# Patient Record
Sex: FEMALE | Race: BLACK OR AFRICAN AMERICAN | NOT HISPANIC OR LATINO | URBAN - METROPOLITAN AREA
[De-identification: names, ages, dates, MRNs, and addresses within clinical notes are randomized per-mention and may not be internally consistent; named-entity substitution may affect disease eponyms.]

---

## 2024-02-20 DIAGNOSIS — O10.919 PREEXISTING HYPERTENSION COMPLICATING PREGNANCY, ANTEPARTUM: ICD-10-CM

## 2024-02-20 DIAGNOSIS — O09.522 MULTIGRAVIDA OF ADVANCED MATERNAL AGE IN SECOND TRIMESTER: Primary | ICD-10-CM

## 2024-02-20 DIAGNOSIS — O09.899 HISTORY OF PRETERM DELIVERY, CURRENTLY PREGNANT: ICD-10-CM

## 2024-02-26 ENCOUNTER — OFFICE VISIT (OUTPATIENT)
Dept: MATERNAL FETAL MEDICINE | Facility: CLINIC | Age: 39
End: 2024-02-26
Payer: OTHER GOVERNMENT

## 2024-02-26 VITALS
RESPIRATION RATE: 20 BRPM | DIASTOLIC BLOOD PRESSURE: 74 MMHG | BODY MASS INDEX: 28.17 KG/M2 | HEIGHT: 63 IN | HEART RATE: 89 BPM | SYSTOLIC BLOOD PRESSURE: 132 MMHG | WEIGHT: 159 LBS | OXYGEN SATURATION: 99 %

## 2024-02-26 DIAGNOSIS — O09.899 HISTORY OF PRETERM DELIVERY, CURRENTLY PREGNANT: ICD-10-CM

## 2024-02-26 DIAGNOSIS — O10.919 PREEXISTING HYPERTENSION COMPLICATING PREGNANCY, ANTEPARTUM: ICD-10-CM

## 2024-02-26 DIAGNOSIS — O09.522 MULTIGRAVIDA OF ADVANCED MATERNAL AGE IN SECOND TRIMESTER: ICD-10-CM

## 2024-02-26 PROCEDURE — 76811 OB US DETAILED SNGL FETUS: CPT | Mod: S$GLB,,, | Performed by: OBSTETRICS & GYNECOLOGY

## 2024-02-26 PROCEDURE — 99203 OFFICE O/P NEW LOW 30 MIN: CPT | Mod: S$GLB,,, | Performed by: OBSTETRICS & GYNECOLOGY

## 2024-02-26 RX ORDER — ASPIRIN 81 MG/1
81 TABLET ORAL DAILY
COMMUNITY
Start: 2023-12-18 | End: 2024-12-06

## 2024-02-26 NOTE — PROGRESS NOTES
"Elina is here for new [facility] consultation for hypertension in pregnancy, preEclampsia with last baby, AMA with negative NIPT and negative MsAFP, and history of late  delivery x 3 referred by Dr. Flores.    The patient is currently taking nothing for blood pressure control.  She is taking a daily low dose aspirin currently.    She is feeling fetal movement.    Elina denies vaginal bleeding, loss of fluid, recurrent contractions.    The patient visual changes or right upper quadrant pain, but does have occasional headaches relieved with Tylenol.    Vitals:    24 1228   BP: 132/74   Pulse: 89   Resp: 20   SpO2: 99%   Weight: 72.1 kg (159 lb)   Height: 5' 3" (1.6 m)     BMI:                    28.17 kg/m^2               "

## 2024-02-26 NOTE — PROGRESS NOTES
"Initial MFM Consultation  Consulting provider: Dr. Anitra Busby  Referring provider: Dr. Flores    Indications for referral:  1) Pregnancy at 23w3d  2) Chronic hypertension on no medications  3) Advanced maternal age with low risk NIPT  4) History of indicated  delivery x3, and no spontaneous  labor.    Dear Dr. Flores,  Thank you for your kind request for consultation and imaging of your patient at the [facility].  She presents due to the above listed indications.  As you know she is a 39 y.o.  with an Estimated Date of Delivery: 24.     PMH: HTN since last delivery.  Sickle cell trait.  PSH:CD x2. Umbilical hernia repair.  POB: G1-  at 35 weeks after induction for reason patient doesn't remember. G2- CD at 36 weeks due to nuchal cord. G3- CD at 35 weeks due to preeclampsia. HTN persisted for 6 months after delivery requiring Nifedipine 60mg daily.  She has not needed antihypertensive medication since .  FH: cHTN.  2 children with sickle cell trait.  Social: Denies tobacco, alcohol, and illicit substance use.  ALL: NKDA  MEDS: ASA, PNV  ROS:No complaints    Physical Exam  /74   Pulse 89   Resp 20   Ht 5' 3" (1.6 m)   Wt 72.1 kg (159 lb)   SpO2 99%   BMI 28.17 kg/m²   General: Age appearing female in no apparent distress.  HEENT:  Normal external facial features. No scleral icterus.  CHEST:  Normal respiratory effort and able to speak in sentences without difficulty.  ABDOMEN:  Gravid, soft, nontender  EXTREMITIES: Without clubbing, cyanosis, edema  NEURO: No focal deficits.  MENTAL STATUS: No focal deficits.    ULTRASOUND FINDINGS:  A detailed fetal anatomic survey was performed. The fetus is cephalic/breech. EFW of X is at the X percentile.  The placenta is post/anterior.  Amniotic fluid is normal. There is a single umbilical artery. There are no fetal structural malformations to extent of view.  Please see accompanying " detailed Viewpoint report.    IMPRESSION:     1) 23 week gestation  2) AMA with low risk NIPT  3) Chronic hypertension not requiring medical management  4) Fetal single umbilical artery    RECOMMENDATIONS/DISCUSSION:  1)  If the patient ever has persistent blood pressures >140/90 then antihypertensive medications should be initiated.  She should have serial ultrasounds for growth.  The next should be performed in your office in 4 weeks.  We will perform one in our office in 8 weeks.  If she does not develop preeclampsia and does not require medical management, delivery is recommended at 38-39 weeks.    2) We discussed her low risk for aneuploidy now that has had a low risk NIPT.  She was made aware of the difference between screening and diagnostic testing for the most common aneuploidies.  She is satisfied with her current risk assessment and does not desire additional testing.    3) An isolated single umbilical artery is of little consequence to the pregnancy.  It can be associated with fetal growth restriction, which we will be screening for throughout this pregnancy anyway.  No modifications to care are recommended because of this.    Thank you for allowing us to participate in her care.  Please do not hesitate to call with questions.  For any questions feel free to call our thomas MURPHY 24/7 at [phone].  -Anitra Busby MD

## 2024-02-26 NOTE — LETTER
February 26, 2024        Lorrie Flores MD  [address]  [facility] [address]             [place] [facility]  [address]  [place] [address]  Phone: [phone]  Fax: [phone]   Patient: Elina Ruiz   MR Number: [id number]   YOB: 1985   Date of Visit: 2/26/2024       Dear Dr. Flores:    Thank you for referring Elina Ruiz to me for evaluation. Attached you will find relevant portions of my assessment and plan of care.    If you have questions, please do not hesitate to call me. I look forward to following Elina Ruiz along with you.    Sincerely,      Anitra Busby MD            [facility]

## 2024-04-11 DIAGNOSIS — O09.899 HISTORY OF PRETERM DELIVERY, CURRENTLY PREGNANT: ICD-10-CM

## 2024-04-11 DIAGNOSIS — O09.523 MULTIGRAVIDA OF ADVANCED MATERNAL AGE IN THIRD TRIMESTER: ICD-10-CM

## 2024-04-11 DIAGNOSIS — O10.919 PREEXISTING HYPERTENSION COMPLICATING PREGNANCY, ANTEPARTUM: Primary | ICD-10-CM

## 2024-04-20 NOTE — PROGRESS NOTES
Indication for follow up consultation:  Intrauterine pregnancy at 31w3d  Chronic hypertension on no medications  Advanced maternal age with low risk NIPT  History of indicated  delivery x3, and no spontaneous  labor.  Single umbilical artery    Provider requesting consultation: Lorrie Flores MD    Dear Lorrie,    I had the pleasure of seeing Elina Ruiz for follow up consultation and sonography today.  Thank you again for allowing us to assist in her care.  As you recall she is a 39 y.o.  at 31w3d with an Estimated Date of Delivery: 24. Today is a followup visit.  She last saw my colleague Dr. Busby in February.  The patient reports that she has been feeling well and has had no complications since we last saw her.  Fetal movement is normal.    Review of systems: The patient denies any vaginal bleeding, loss of fluid or contraction pain today.  Vitals:    24 0927   BP: 118/74   Pulse: 96   Resp: 18   SpO2: 97%   Weight: 77.1 kg (170 lb)     Body mass index is 30.11 kg/m².    Physical exam:  Gen: WDWN in NAD  HEENT: WNL  Abdomen: Soft, non-tender, non-distended  Skin: No rash or jaundice  Extremities: Symmetric in size, no clubbing, cyanosis, or edema    Ultrasound:  A repeat detailed fetal anatomical survey was completed once again today.  Today the fetus is transverse with head on the maternal right side.  Heart rate 149 beats per minute.  KIMMIE 13 cm.  Umbilical cord is two-vessel.  Overall growth is appropriate at 25th percentile or 3 lb 12 oz.  The abdominal circumference was around the 25th of 30th percentile.  Detailed fetal anatomic survey was repeated today.  I saw normal intracranial contents.  Face remained head in.  I saw 4 chamber view of the heart along with left and right ventricular outflow tracts and normal aortic arch.  I saw normal lungs, stomach, kidneys, bowel, bladder, spine, extremities.  Please SEE ATTACHED REPORT for full details.       Assessment:  Intrauterine pregnancy at 31w3d  Chronic hypertension on no medications  Advanced maternal age with low risk NIPT  History of indicated  delivery x3, and no spontaneous  labor.  Single umbilical artery    Recommendations:   I am glad to report that I was able to get a good look at the anatomy again today and see no new concerns.  Single umbilical artery is again demonstrated but overall growth is appropriate at the 25th percentile with normal abdominal circumference  We do not have to see her back here again unless other complications arise.  Please perform growth scans every 4 weeks from this point forward and initiate weekly  testing at 32 weeks  If growth concerns arise please call me to discuss instead of referring her back up here then I can determine if we need to physically get her back in  Regarding timing of delivery as you know she is delivered all of her others in the late  period for various indications, saw would not be surprised if that happens again.  If she remains stable however then the goal for delivery is 38-39 weeks.      We greatly appreciate you allowing us to assist in her care.  Please call with any questions or concerns.    Sincerely,    Donavon Sepulveda Jr., M.D.  Maternal Fetal Medicine    Total time personally involved in this patient's care was 26 minutes.

## 2024-04-22 ENCOUNTER — PROCEDURE VISIT (OUTPATIENT)
Dept: MATERNAL FETAL MEDICINE | Facility: CLINIC | Age: 39
End: 2024-04-22
Payer: OTHER GOVERNMENT

## 2024-04-22 VITALS
WEIGHT: 170 LBS | OXYGEN SATURATION: 97 % | RESPIRATION RATE: 18 BRPM | DIASTOLIC BLOOD PRESSURE: 74 MMHG | HEART RATE: 96 BPM | SYSTOLIC BLOOD PRESSURE: 118 MMHG | BODY MASS INDEX: 30.11 KG/M2

## 2024-04-22 DIAGNOSIS — O09.523 MULTIGRAVIDA OF ADVANCED MATERNAL AGE IN THIRD TRIMESTER: ICD-10-CM

## 2024-04-22 DIAGNOSIS — O09.899 HISTORY OF PRETERM DELIVERY, CURRENTLY PREGNANT: ICD-10-CM

## 2024-04-22 DIAGNOSIS — O09.899 SINGLE UMBILICAL ARTERY AFFECTING MANAGEMENT OF MOTHER IN SINGLETON PREGNANCY, ANTEPARTUM: ICD-10-CM

## 2024-04-22 DIAGNOSIS — O10.919 PREEXISTING HYPERTENSION COMPLICATING PREGNANCY, ANTEPARTUM: Primary | ICD-10-CM

## 2024-04-22 PROCEDURE — 99213 OFFICE O/P EST LOW 20 MIN: CPT | Mod: 25,S$GLB,, | Performed by: OBSTETRICS & GYNECOLOGY

## 2024-04-22 PROCEDURE — 76816 OB US FOLLOW-UP PER FETUS: CPT | Mod: 26,,, | Performed by: OBSTETRICS & GYNECOLOGY

## 2024-04-22 RX ORDER — FOLIC ACID 1 MG/1
TABLET ORAL
COMMUNITY
Start: 2023-11-20

## 2024-04-22 RX ORDER — PNV,CALCIUM 72/IRON/FOLIC ACID 27 MG-1 MG
TABLET ORAL
COMMUNITY
Start: 2023-11-20

## 2024-04-22 NOTE — LETTER
April 22, 2024        Lorrie Flores MD  [address]  [facility] [address]             [place] [facility]  [address]  [place] [address]  Phone: [phone]  Fax: [phone]   Patient: Elina Ruiz   MR Number: [id number]   YOB: 1985   Date of Visit: 4/22/2024       Dear Dr. Flores:    Thank you for referring Elina Ruiz to me for evaluation. Below are the relevant portions of my assessment and plan of care.            If you have questions, please do not hesitate to call me. I look forward to following Elina along with you.    Sincerely,      Donavon Sepulveda Jr., MD           CC  No Recipients

## 2024-04-22 NOTE — PROGRESS NOTES
Elina is here for followup [facility] consultation for 2 vessel umbilical cord, hypertension in pregnancy, AMA with negative testing, and history of  delivery x 3  referred by Dr. Flores.    The patient is currently taking nothing for blood pressure control.  She is taking a daily low dose aspirin currently.    She is feeling fetal movement.    Elina denies vaginal bleeding, loss of fluid, recurrent contractions.    The patient denies headache, visual changes or right upper quadrant pain.    Vitals:    24 0927   BP: 118/74   Pulse: 96   Resp: 18   SpO2: 97%   Weight: 77.1 kg (170 lb)     BMI:                    30.11 kg/m^2

## 2024-05-21 ENCOUNTER — TELEPHONE (OUTPATIENT)
Dept: MATERNAL FETAL MEDICINE | Facility: CLINIC | Age: 39
End: 2024-05-21
Payer: OTHER GOVERNMENT

## 2024-05-21 NOTE — TELEPHONE ENCOUNTER
Rec'd request for f/u MFM appt for SGA, scheduled for 5/30/24. Prior reports reviewed, 4/22/24 recommendations are for Dr. Flores to call MFM MD if growth concerns arise to discuss POC to see if return appt is needed. Dr. Cota (on call) reviewed Dr. Sepulveda's note from 4/22/24 and office requesting f/u appt for SGA.   Dr. Cota recommends weekly BPP and delivery at 37.0 weeks; states is willing to discuss case with Dr. Flores by phone in case other issues of concern.  Neelima, office nurse for Dr. Flores, notified of above POC and encouraged to have Dr. Flores call Dr. Cota this morning to discuss directly. Will cancel 5/30 appt if above POC unchanged.